# Patient Record
Sex: FEMALE | Race: WHITE
[De-identification: names, ages, dates, MRNs, and addresses within clinical notes are randomized per-mention and may not be internally consistent; named-entity substitution may affect disease eponyms.]

---

## 2018-06-16 ENCOUNTER — HOSPITAL ENCOUNTER (INPATIENT)
Dept: HOSPITAL 17 - HOBED | Age: 38
LOS: 1 days | Discharge: HOME | End: 2018-06-17
Attending: OBSTETRICS & GYNECOLOGY | Admitting: OBSTETRICS & GYNECOLOGY
Payer: COMMERCIAL

## 2018-06-16 VITALS — HEART RATE: 60 BPM | OXYGEN SATURATION: 100 % | DIASTOLIC BLOOD PRESSURE: 58 MMHG | SYSTOLIC BLOOD PRESSURE: 122 MMHG

## 2018-06-16 VITALS
OXYGEN SATURATION: 98 % | RESPIRATION RATE: 20 BRPM | TEMPERATURE: 98.7 F | HEART RATE: 86 BPM | DIASTOLIC BLOOD PRESSURE: 69 MMHG | SYSTOLIC BLOOD PRESSURE: 119 MMHG

## 2018-06-16 VITALS — DIASTOLIC BLOOD PRESSURE: 73 MMHG | HEART RATE: 88 BPM | SYSTOLIC BLOOD PRESSURE: 121 MMHG | OXYGEN SATURATION: 100 %

## 2018-06-16 VITALS — DIASTOLIC BLOOD PRESSURE: 69 MMHG | SYSTOLIC BLOOD PRESSURE: 117 MMHG | HEART RATE: 93 BPM

## 2018-06-16 VITALS — HEART RATE: 93 BPM | OXYGEN SATURATION: 100 %

## 2018-06-16 VITALS — HEIGHT: 65 IN | WEIGHT: 189.99 LBS | BODY MASS INDEX: 31.65 KG/M2

## 2018-06-16 VITALS — DIASTOLIC BLOOD PRESSURE: 64 MMHG | RESPIRATION RATE: 16 BRPM | HEART RATE: 77 BPM | SYSTOLIC BLOOD PRESSURE: 107 MMHG

## 2018-06-16 VITALS — OXYGEN SATURATION: 100 % | HEART RATE: 106 BPM

## 2018-06-16 VITALS — DIASTOLIC BLOOD PRESSURE: 83 MMHG | OXYGEN SATURATION: 100 % | SYSTOLIC BLOOD PRESSURE: 135 MMHG | HEART RATE: 121 BPM

## 2018-06-16 VITALS — HEART RATE: 110 BPM | OXYGEN SATURATION: 100 %

## 2018-06-16 VITALS — DIASTOLIC BLOOD PRESSURE: 64 MMHG | HEART RATE: 88 BPM | RESPIRATION RATE: 16 BRPM | SYSTOLIC BLOOD PRESSURE: 113 MMHG

## 2018-06-16 VITALS — HEART RATE: 97 BPM | OXYGEN SATURATION: 100 %

## 2018-06-16 VITALS — RESPIRATION RATE: 18 BRPM | DIASTOLIC BLOOD PRESSURE: 66 MMHG | SYSTOLIC BLOOD PRESSURE: 116 MMHG | HEART RATE: 91 BPM

## 2018-06-16 VITALS — OXYGEN SATURATION: 100 % | HEART RATE: 124 BPM

## 2018-06-16 VITALS — TEMPERATURE: 98 F | RESPIRATION RATE: 20 BRPM

## 2018-06-16 VITALS — RESPIRATION RATE: 18 BRPM | HEART RATE: 87 BPM | DIASTOLIC BLOOD PRESSURE: 63 MMHG | SYSTOLIC BLOOD PRESSURE: 106 MMHG

## 2018-06-16 VITALS — HEART RATE: 90 BPM | OXYGEN SATURATION: 100 %

## 2018-06-16 VITALS — HEART RATE: 114 BPM | OXYGEN SATURATION: 100 %

## 2018-06-16 VITALS — SYSTOLIC BLOOD PRESSURE: 113 MMHG | DIASTOLIC BLOOD PRESSURE: 68 MMHG | HEART RATE: 100 BPM

## 2018-06-16 VITALS
SYSTOLIC BLOOD PRESSURE: 103 MMHG | TEMPERATURE: 97.4 F | HEART RATE: 70 BPM | DIASTOLIC BLOOD PRESSURE: 69 MMHG | RESPIRATION RATE: 18 BRPM

## 2018-06-16 VITALS — HEART RATE: 104 BPM | OXYGEN SATURATION: 100 %

## 2018-06-16 VITALS — RESPIRATION RATE: 18 BRPM | HEART RATE: 99 BPM | DIASTOLIC BLOOD PRESSURE: 62 MMHG | SYSTOLIC BLOOD PRESSURE: 118 MMHG

## 2018-06-16 VITALS — DIASTOLIC BLOOD PRESSURE: 81 MMHG | HEART RATE: 99 BPM | SYSTOLIC BLOOD PRESSURE: 139 MMHG

## 2018-06-16 VITALS — HEART RATE: 119 BPM | OXYGEN SATURATION: 100 %

## 2018-06-16 VITALS — OXYGEN SATURATION: 100 % | HEART RATE: 96 BPM

## 2018-06-16 VITALS — HEART RATE: 132 BPM | OXYGEN SATURATION: 100 %

## 2018-06-16 VITALS — RESPIRATION RATE: 18 BRPM

## 2018-06-16 VITALS — HEART RATE: 94 BPM | OXYGEN SATURATION: 100 %

## 2018-06-16 VITALS — SYSTOLIC BLOOD PRESSURE: 130 MMHG | DIASTOLIC BLOOD PRESSURE: 78 MMHG | OXYGEN SATURATION: 100 % | HEART RATE: 110 BPM

## 2018-06-16 VITALS — HEART RATE: 98 BPM | OXYGEN SATURATION: 100 %

## 2018-06-16 VITALS — OXYGEN SATURATION: 100 % | HEART RATE: 85 BPM

## 2018-06-16 VITALS — OXYGEN SATURATION: 100 % | HEART RATE: 101 BPM

## 2018-06-16 VITALS — HEART RATE: 120 BPM | OXYGEN SATURATION: 100 %

## 2018-06-16 VITALS — RESPIRATION RATE: 20 BRPM

## 2018-06-16 VITALS — RESPIRATION RATE: 16 BRPM

## 2018-06-16 DIAGNOSIS — O36.63X0: Primary | ICD-10-CM

## 2018-06-16 DIAGNOSIS — Z3A.38: ICD-10-CM

## 2018-06-16 LAB
AMORPHOUS SEDIMENT, URINE: (no result)
BACTERIA #/AREA URNS HPF: (no result) /HPF
BASOPHILS # BLD AUTO: 0 TH/MM3 (ref 0–0.2)
BASOPHILS NFR BLD: 0.5 % (ref 0–2)
EOSINOPHIL # BLD: 0 TH/MM3 (ref 0–0.4)
EOSINOPHIL NFR BLD: 0.4 % (ref 0–4)
ERYTHROCYTE [DISTWIDTH] IN BLOOD BY AUTOMATED COUNT: 15 % (ref 11.6–17.2)
GLUCOSE UR STRIP-MCNC: (no result) MG/DL
HCT VFR BLD CALC: 34.3 % (ref 35–46)
HGB BLD-MCNC: 11.5 GM/DL (ref 11.6–15.3)
HGB UR QL STRIP: (no result)
KETONES UR STRIP-MCNC: (no result) MG/DL
LYMPHOCYTES # BLD AUTO: 1.8 TH/MM3 (ref 1–4.8)
LYMPHOCYTES NFR BLD AUTO: 19.9 % (ref 9–44)
MCH RBC QN AUTO: 27 PG (ref 27–34)
MCHC RBC AUTO-ENTMCNC: 33.5 % (ref 32–36)
MCV RBC AUTO: 80.8 FL (ref 80–100)
MONOCYTE #: 0.6 TH/MM3 (ref 0–0.9)
MONOCYTES NFR BLD: 6.6 % (ref 0–8)
NEUTROPHILS # BLD AUTO: 6.5 TH/MM3 (ref 1.8–7.7)
NEUTROPHILS NFR BLD AUTO: 72.6 % (ref 16–70)
NITRITE UR QL STRIP: (no result)
PLATELET # BLD: 371 TH/MM3 (ref 150–450)
PMV BLD AUTO: 7.9 FL (ref 7–11)
RBC # BLD AUTO: 4.24 MIL/MM3 (ref 4–5.3)
SP GR UR STRIP: 1 (ref 1–1.03)
SQUAMOUS #/AREA URNS HPF: 3 /HPF (ref 0–5)
URINE LEUKOCYTE ESTERASE: (no result)
WBC # BLD AUTO: 9 TH/MM3 (ref 4–11)

## 2018-06-16 PROCEDURE — 0HQ9XZZ REPAIR PERINEUM SKIN, EXTERNAL APPROACH: ICD-10-PCS | Performed by: OBSTETRICS & GYNECOLOGY

## 2018-06-16 PROCEDURE — 80307 DRUG TEST PRSMV CHEM ANLYZR: CPT

## 2018-06-16 PROCEDURE — 86900 BLOOD TYPING SEROLOGIC ABO: CPT

## 2018-06-16 PROCEDURE — 86901 BLOOD TYPING SEROLOGIC RH(D): CPT

## 2018-06-16 PROCEDURE — 3E0S3BZ INTRODUCTION OF ANESTHETIC AGENT INTO EPIDURAL SPACE, PERCUTANEOUS APPROACH: ICD-10-PCS | Performed by: OBSTETRICS & GYNECOLOGY

## 2018-06-16 PROCEDURE — 84112 EVAL AMNIOTIC FLUID PROTEIN: CPT

## 2018-06-16 PROCEDURE — 85461 HEMOGLOBIN FETAL: CPT

## 2018-06-16 PROCEDURE — 86850 RBC ANTIBODY SCREEN: CPT

## 2018-06-16 PROCEDURE — 81001 URINALYSIS AUTO W/SCOPE: CPT

## 2018-06-16 PROCEDURE — 90384 RH IG FULL-DOSE IM: CPT

## 2018-06-16 PROCEDURE — 85025 COMPLETE CBC W/AUTO DIFF WBC: CPT

## 2018-06-16 RX ADMIN — IBUPROFEN PRN MG: 800 TABLET, FILM COATED ORAL at 19:50

## 2018-06-16 RX ADMIN — ACETAMINOPHEN PRN MG: 325 TABLET ORAL at 17:51

## 2018-06-16 RX ADMIN — OXYTOCIN SCH MLS/HR: 10 INJECTION, SOLUTION INTRAMUSCULAR; INTRAVENOUS at 08:07

## 2018-06-16 RX ADMIN — OXYTOCIN SCH MLS/HR: 10 INJECTION, SOLUTION INTRAMUSCULAR; INTRAVENOUS at 04:27

## 2018-06-16 RX ADMIN — IBUPROFEN PRN MG: 800 TABLET, FILM COATED ORAL at 11:57

## 2018-06-16 NOTE — HHI.HP
HPI


Chief Complaint


Water broke and ildefonso


Date Seen:  2018


Time Seen:  03:00


Travel History


International Travel<30 Days:  No


Contact w/Intl Traveler<30Days:  No


Known Affected Area:  No





History of Present Illness


HPI


37-year-old white female  at 38 weeks sees Dr. Liao for prenatal care 

presents with gross rupture of membranes and contractions, no bleeding, NST is 

reactive she is ildefonso every 2-3 minutes she feels them


Weeks Gestation:  38


Para:  1


:  2





History


Obstetric History


Obstetric History


1 vaginal delivery





Past Surgical History


*** Narrative Surgical


Appendectomy





Family History


Family History:  





Social History


Alcohol Use:  No


Tobacco Use:  No


Substance Abuse:  No





Allergies-Medications


(Allergen,Severity, Reaction):  


Coded Allergies:  


     codeine (Unverified  Adverse Reaction, Mild, VOMITING, 8/15/17)


Home Meds


Reported Medications


Propoxyphene-N/Acetaminophen (Darvocet-N 100)  Tab, 1 TAB PO Q4HPRN, #30


   FOR PAIN


   8/17/10


[Delilah Q2]   No Conflict Check, PO


   8/15/10


Docusate Sodium (Colace) 100 Mg Cap, 100 MG PO BID


   8/15/10


Multivit/Min/Fol Ac/Iron/Pren (Prenatal Vit (Prenatal Plus))  Tab, 1 TAB PO 

DAILY


   8/15/10





Review of Systems


General / Constitutional:  No: Fever, Weight Gain, Chills, Other


Eyes:  No: Diploplia, Blurred Vision, Visual changes, Pain, Photophobia


HENT:  No: Headaches, Vertigo, Lightheadedness


Cardiovascular:  No: Irregular Rhythm, Chest Pain or Discomfort, Palpitations, 

Tachycardia, Syncope, Varicosities, Edema, Cyanosis


Respiratory:  No: Cough, Short of Breath, Other


Gastrointestinal:  No: Nausea, Vomiting, Diarrhea


Genitourinary:  No: Decreased Urinary Output, Oliguria


Musculoskeletal:  No: Limited ROM, Weakness, Cramping, Edema, Pain


Skin:  No Rash, No Itching, No Dryness, No Lumps, No Change in Pigmentation, No 

Change in Nails, No Alopecia, No Lesions


Neurologic:  No: Weakness, Dizziness, Syncope, Focal Abnormalities, 

Coordination Problem, Headache, Slurred Speech, Seizures


Psychiatric:  No: Depression, Suicidal Ideations, Homicidal Ideation


Endocrine:  No: Heat Intolerance, Cold Intolerance, Polydipsia, Polyuria, Other





Physical Exam


Narrative


GENERAL: Well-nourished, well-developed patient.


SKIN: Warm and dry.


HEAD: Normocephalic and atraumatic.


EYES: No scleral icterus. No injection or drainage. 


ENT: No nasal drainage noted. Mucous membranes pink. Airway patent.


NECK: Supple, trachea midline. No JVD.


CARDIOVASCULAR: Regular rate and rhythm without murmurs, gallops, or rubs. 


RESPIRATORY: Breath sounds equal bilaterally. No accessory muscle use.


BREASTS: Bilateral exam showed no masses , no retractions, no nipple discharge.


ABDOMEN/GI: Abdomen soft, non-tender, bowel sounds present, no rebound, no 

guarding 


   Gravid to [-38] weeks size


   Fundal Height: [-38]


GENITOURINARY: 


   External Genitalia: intact and normal in appearance


   BUS glands: [-]


   Cervix: [post-]


   Dilatation: [-4-5]          


   Effacement: [70-]          


   Station: [-2]  


   Presentation: [-vtx]        


   Membranes: [  ruptured]


   Uterine Contractions: [reg-]


FHT's: 


   Category: [1-]   


   Baseline: [133-]   


   Reactive: [R-]   


   Variability: [-mod]  


   Decels: [0-]  


EXTREMITIES: No cyanosis or edema.


BACK: Nontender without obvious deformity. No CVA tenderness.


NEUROLOGICAL: Awake and alert. Motor and sensory grossly within normal limits. 

Five out of 5 muscle strength in all muscle groups. Normal speech.





Caprini VTE Risk Assessment


Caprini VTE Risk Assessment:  No/Low Risk (score <= 1)


Caprini Risk Assessment Model











 Point Value = 1          Point Value = 2  Point Value = 3  Point Value = 5


 


Age 41-60


Minor surgery


BMI > 25 kg/m2


Swollen legs


Varicose veins


Pregnancy or postpartum


History of unexplained or recurrent


   spontaneous 


Oral contraceptives or hormone


   replacement


Sepsis (< 1 month)


Serious lung disease, including


   pneumonia (< 1 month)


Abnormal pulmonary function


Acute myocardial infarction


Congestive heart failure (< 1 month)


History of inflammatory bowel disease


Medical patient at bed rest Age 61-74


Arthroscopic surgery


Major open surgery (> 45 min)


Laparoscopic surgery (> 45 min)


Malignancy


Confined to bed (> 72 hours)


Immobilizing plaster cast


Central venous access Age >= 75


History of VTE


Family history of VTE


Factor V Leiden


Prothrombin 60764H


Lupus anticoagulant


Anticardiolipin antibodies


Elevated serum homocysteine


Heparin-induced thrombocytopenia


Other congenital or acquired


   thrombophilia Stroke (< 1 month)


Elective arthroplasty


Hip, pelvis, or leg fracture


Acute spinal cord injury (< 1 month)








Prophylaxis Regimen











   Total Risk


Factor Score Risk Level Prophylaxis Regimen


 


0-1      Low Early ambulation


 


2 Moderate Order ONE of the following:


*Sequential Compression Device (SCD)


*Heparin 5000 units SQ BID


 


3-4 Higher Order ONE of the following medications:


*Heparin 5000 units SQ TID


*Enoxaparin/Lovenox 40 mg SQ daily (WT < 150 kg, CrCl > 30 mL/min)


*Enoxaparin/Lovenox 30 mg SQ daily (WT < 150 kg, CrCl > 10-29 mL/min)


*Enoxaparin/Lovenox 30 mg SQ BID (WT < 150 kg, CrCl > 30 mL/min)


AND/OR


*Sequential Compression Device (SCD)


 


5 or more Highest Order ONE of the following medications:


*Heparin 5000 units SQ TID (Preferred with Epidurals)


*Enoxaparin/Lovenox 40 mg SQ daily (WT < 150 kg, CrCl > 30 mL/min)


*Enoxaparin/Lovenox 30 mg SQ daily (WT < 150 kg, CrCl > 10-29 mL/min)


*Enoxaparin/Lovenox 30 mg SQ BID (WT < 150 kg, CrCl > 30 mL/min)


AND


*Sequential Compression Device (SCD)











Data


Data


Orders





 Orders


Ob (2e) Additional Admit Info (18 02:55)


Group B Strep:  Negative





Assessment/Plan


Assessment and Plan


Impression--SROM at 38 weeks with contractions





Plan-admit to labor and delivery, manage labor and augment as needed, 

anticipate vaginal delivery, will notify Dr. Pelaez who is on call for Burke Byrd II, MD 2018 03:04

## 2018-06-16 NOTE — PD.OB.DELI
Weeks gestation:  38


Anesthesia:  Epidural


Episiotomy:  None


Vaginal Delivery:  Normal


Presentation:  Occiput anterior


Nuchal Cord:  None


Delayed cord clamping (45 sec):  Yes


Shoulder Dystocia:  Suprapubic pressure given, Kiara maneuver done, Wood's 

screw maneuver done


Infant:  Male, Single


Delivery date:  2018


Delivery time:  10:02


One Minute APGAR:  8


Five Minute APGAR:  9


Birth Weight:  8-12


Placenta:  Spontaneous delivery, Intact, 3 vessel cord


Laceration:  Vaginal laceration, 1 deg


Repair:  Chromic running


Estimated blood loss:  300











Gissel Pradhan MD 2018 10:17

## 2018-06-17 VITALS
TEMPERATURE: 98.5 F | DIASTOLIC BLOOD PRESSURE: 80 MMHG | SYSTOLIC BLOOD PRESSURE: 121 MMHG | HEART RATE: 101 BPM | RESPIRATION RATE: 18 BRPM

## 2018-06-17 VITALS
SYSTOLIC BLOOD PRESSURE: 136 MMHG | DIASTOLIC BLOOD PRESSURE: 81 MMHG | HEART RATE: 103 BPM | RESPIRATION RATE: 18 BRPM | TEMPERATURE: 98.3 F

## 2018-06-17 VITALS
RESPIRATION RATE: 20 BRPM | SYSTOLIC BLOOD PRESSURE: 119 MMHG | HEART RATE: 97 BPM | TEMPERATURE: 98.1 F | DIASTOLIC BLOOD PRESSURE: 71 MMHG

## 2018-06-17 VITALS — OXYGEN SATURATION: 97 %

## 2018-06-17 RX ADMIN — ACETAMINOPHEN PRN MG: 325 TABLET ORAL at 01:14

## 2018-06-17 RX ADMIN — IBUPROFEN PRN MG: 800 TABLET, FILM COATED ORAL at 12:05

## 2018-06-17 RX ADMIN — IBUPROFEN PRN MG: 800 TABLET, FILM COATED ORAL at 03:47

## 2018-06-17 NOTE — HHI.DS
Admission Date


2018 at 02:59


Admitting Diagnosis





Diagnosis:  


Delivery Date:  2018


Vaginal Delivery:  Normal


Infant:  Male, Single


Brief History


37-year-old white female  at 38 weeks sees Dr. Liao for prenatal care 

presents with gross rupture of membranes and contractions, no bleeding, NST is 

reactive she is ildefonso every 2-3 minutes she feels them


Pt Condition on Discharge:  Good


Discharge Disposition:  Discharge Home


Discharge Instructions


Diet Instructions:  As Tolerated, No Restrictions


Activities You Can Perform:  Pelvic Rest











Gissel Pradhan MD 2018 10:20

## 2018-06-17 NOTE — HHI.DCPOC
Discharge Care Plan








Your Health Problems Are: Vaginal delivery








Report Symptoms to Your Doctor


-Temperature above 100.5 degrees


-Redness, of incision or excessive or foul smelling drainage


-Unusual pain or calf pain


-Increased vaginal bleeding


-Painful or difficulty urinating


-Feelings of extreme sadness or anxiety after 2 weeks


Goals to Promote Your Health


* To prevent worsening of your condition and complications


* To maintain your health at the optimal level


Directions to Meet Your Goals


*** Take your medications as prescribed


*** Follow your dietary instruction


*** Follow activity as directed


*** Ensure plenty of rest for recovery


*** Drink fluids for hydration








*** Keep your appointments as scheduled


*** Take your immunizations and boosters as scheduled


*** If your symptoms worsen call your PCP, if no PCP go to Urgent Care Center 

or Emergency Room***


*** Smoking is Dangerous to Your Health. Avoid second hand smoke***


***Call the 24-hour crisis hotline for domestic abuse at 1-761.767.6569***











Gissel Pradhan MD Jun 17, 2018 10:18